# Patient Record
Sex: FEMALE | Race: WHITE
[De-identification: names, ages, dates, MRNs, and addresses within clinical notes are randomized per-mention and may not be internally consistent; named-entity substitution may affect disease eponyms.]

---

## 2017-08-17 ENCOUNTER — HOSPITAL ENCOUNTER (EMERGENCY)
Dept: HOSPITAL 56 - MW.ED | Age: 37
Discharge: HOME | End: 2017-08-17
Payer: COMMERCIAL

## 2017-08-17 VITALS — SYSTOLIC BLOOD PRESSURE: 117 MMHG | DIASTOLIC BLOOD PRESSURE: 64 MMHG

## 2017-08-17 DIAGNOSIS — L03.116: Primary | ICD-10-CM

## 2017-08-17 DIAGNOSIS — Z98.890: ICD-10-CM

## 2017-08-17 PROCEDURE — 85025 COMPLETE CBC W/AUTO DIFF WBC: CPT

## 2017-08-17 PROCEDURE — 36415 COLL VENOUS BLD VENIPUNCTURE: CPT

## 2017-08-17 PROCEDURE — 99283 EMERGENCY DEPT VISIT LOW MDM: CPT

## 2017-08-17 PROCEDURE — 73590 X-RAY EXAM OF LOWER LEG: CPT

## 2017-08-17 PROCEDURE — 96372 THER/PROPH/DIAG INJ SC/IM: CPT

## 2017-08-17 PROCEDURE — 73562 X-RAY EXAM OF KNEE 3: CPT

## 2017-08-17 PROCEDURE — 86140 C-REACTIVE PROTEIN: CPT

## 2017-08-17 PROCEDURE — 85652 RBC SED RATE AUTOMATED: CPT

## 2017-08-17 NOTE — EDM.PDOC
ED HPI GENERAL MEDICAL PROBLEM





- General


Chief Complaint: Lower Extremity Injury/Pain


Stated Complaint: LT LEG HURTS


Time Seen by Provider: 08/17/17 09:35





- History of Present Illness


INITIAL COMMENTS - FREE TEXT/NARRATIVE: 





HISTORY AND PHYSICAL:





History of present illness:


This is a 37-year-old female presenting to the emergency department complaining 

of left lower leg pain. Patient tells me she has a history of a fractured tibia 

requiring surgical intervention last year. She tells me that she began to 

experience pain in the exact spot of the break yesterday. She'll be she is 

having difficulty bending her knee. She is having difficulty bearing weight. 

She denies any trauma to the region. She denies any numbness or tingling. She 

denies any fevers chills nausea or vomiting. She has no other complaints. 





Review of systems: 


As per history of present illness and below otherwise all systems reviewed and 

negative.





Past medical history: 


As per history of present illness and as reviewed below otherwise 

noncontributory.





Surgical history: 


As per history of present illness and as reviewed below otherwise 

noncontributory.





Social history: 


No reported history of drug or alcohol abuse.





Family history: 


As per history of present illness and as reviewed below otherwise 

noncontributory.





Physical exam:


HEENT: Atraumatic, normocephalic, pupils reactive, negative for conjunctival 

pallor or scleral icterus, mucous membranes moist, throat clear, neck supple, 

nontender, trachea midline.


Lungs: Clear to auscultation, breath sounds equal bilaterally, chest nontender.


Heart: S1S2, regular, negative for clicks, rubs, or JVD.


Abdomen: Soft, nondistended, nontender. Negative for masses or 

hepatosplenomegaly. Negative for costovertebral tenderness.


Pelvis: Stable nontender.


Genitourinary: Deferred.


Rectal: Deferred.


Extremities:Tenderness to palpation of the lateral aspect of the leg. No knee 

joint effusion. No tenderness to palpation. Patient was able to flex her knee 

with assistance. Ankle and hip exam grossly normal. 


Neuro: Awake, alert, oriented. Cranial nerves II through XII unremarkable. 

Cerebellum unremarkable. Motor and sensory unremarkable throughout. Exam 

nonfocal.





Diagnostics:


Knee x-ray 


Tibia fibula x-ray 


CBC 


CMP 


Therapeutics:


IM Toradol 30 mg.





Impression: 


Cellulitis 


Plan:


Discharged home. Follow-up with orthopedics


Keflex 500 mg by mouth 3 times a day 10 days #30 tabs 0 refills. Follow-up 

with her primary care provider.











  ** left leg


Pain Score (Numeric/FACES): 7





- Related Data


 Allergies











Allergy/AdvReac Type Severity Reaction Status Date / Time


 


No Known Allergies Allergy   Verified 08/17/17 09:45











Home Meds: 


 Home Meds





Cephalexin [Keflex] 500 mg PO TID #30 cap 08/17/17 [Rx]


Naproxen 500 mg PO BID #20 tablet 08/17/17 [Rx]











Past Medical History





- Past Health History


Medical/Surgical History: Denies Medical/Surgical History





- Past Surgical History


Musculoskeletal Surgical History: Reports: Other (See Below)


Other Musculoskeletal Surgeries/Procedures:: left leg sx





Social & Family History





- Family History


Family Medical History: Noncontributory





- Tobacco Use


Smoking Status *Q: Never Smoker


Years of Tobacco use: 14


Packs/Tins Daily: 0.5





- Recreational Drug Use


Recreational Drug Use: No





Review of Systems





- Review of Systems


Review Of Systems: See Below





ED EXAM, GENERAL





- Physical Exam


Exam: See Below





Course





- Vital Signs


Last Recorded V/S: 


 Last Vital Signs











Temp  36.2 C   08/17/17 09:46


 


Pulse  97   08/17/17 09:46


 


Resp  20   08/17/17 09:46


 


BP  112/75   08/17/17 09:46


 


Pulse Ox  98   08/17/17 09:46














- Orders/Labs/Meds


Orders: 


 Active Orders 24 hr











 Category Date Time Status


 


 CRP [C-REACTIVE PROTEIN] [CHEM] Stat Lab  08/17/17 10:50 Received


 


 SEDIMENTATION RATE AUTO [HEME] Stat Lab  08/17/17 10:50 Received











Labs: 


 Laboratory Tests











  08/17/17 Range/Units





  10:50 


 


WBC  14.21 H  (4.0-11.0)  K/uL


 


RBC  4.76  (4.30-5.90)  M/uL


 


Hgb  14.4  (12.0-16.0)  g/dL


 


Hct  42.7  (36.0-46.0)  %


 


MCV  89.7  (80.0-98.0)  fL


 


MCH  30.3  (27.0-32.0)  pg


 


MCHC  33.7  (31.0-37.0)  g/dL


 


RDW Std Deviation  43.3  (28.0-62.0)  fl


 


RDW Coeff of Linda  13  (11.0-15.0)  %


 


Plt Count  294  (150-400)  K/uL


 


MPV  11.40  (7.40-12.00)  fL


 


Neut % (Auto)  59.8  (48.0-80.0)  %


 


Lymph % (Auto)  29.3  (16.0-40.0)  %


 


Mono % (Auto)  7.5  (0.0-15.0)  %


 


Eos % (Auto)  3.0  (0.0-7.0)  %


 


Baso % (Auto)  0.4  (0.0-1.5)  %


 


Neut # (Auto)  8.5 H  (1.4-5.7)  K/uL


 


Lymph # (Auto)  4.2 H  (0.6-2.4)  K/uL


 


Mono # (Auto)  1.1 H  (0.0-0.8)  K/uL


 


Eos # (Auto)  0.4  (0.0-0.7)  K/uL


 


Baso # (Auto)  0.1  (0.0-0.1)  K/uL











Meds: 


Medications














Discontinued Medications














Generic Name Dose Route Start Last Admin





  Trade Name Freq  PRN Reason Stop Dose Admin


 


Ketorolac Tromethamine  30 mg  08/17/17 09:58  08/17/17 10:23





  Toradol  IVPUSH  08/17/17 09:59  Not Given





  ONETIME ONE   


 


Ketorolac Tromethamine  30 mg  08/17/17 10:18  08/17/17 10:24





  Toradol  IM  08/17/17 10:19  30 mg





  ONETIME ONE   Administration














Departure





- Departure


Time of Disposition: 11:36


Disposition: Home, Self-Care 01


Condition: Good


Clinical Impression: 


 Cellulitis of knee, left








- Discharge Information


Prescriptions: 


Cephalexin [Keflex] 500 mg PO TID #30 cap


Naproxen 500 mg PO BID #20 tablet


Referrals: 


PCP,None [Primary Care Provider] - 


Becky Brizuela MD [Physician] - 


Ran Howard MD [Physician] - 


Forms:  ED Department Discharge


Additional Instructions: 





The following information is given to patients seen in the emergency department 

who are being discharged to home. This information is to outline your options 

for follow-up care. We provide all patients seen in our emergency department 

with a follow-up referral.





The need for follow-up, as well as the timing and circumstances, are variable 

depending upon the specifics of your emergency department visit.





If you don't have a primary care physician on staff, we will provide you with a 

referral. We always advise you to contact your personal physician following an 

emergency department visit to inform them of the circumstance of the visit and 

for follow-up with them and/or the need for any referrals to a consulting 

specialist.





The emergency department will also refer you to a specialist when appropriate. 

This referral assures that you have the opportunity for follow-up care with a 

specialist. All of these measure are taken in an effort to provide you with 

optimal care, which includes your follow-up.





Under all circumstances we always encourage you to contact your private 

physician who remains a resource for coordinating your care. When calling for 

follow-up care, please make the office aware that this follow-up is from your 

recent emergency room visit. If for any reason you are refused follow-up, 

please contact the [facility] Emergency 

Department at [phone] and asked to speak to the emergency department 

charge nurse.





It was found at today's visit that likely you have the soft tissue infection 

going on requiring antibiotics. Also recommend anti-inflammatories for this. 

Reportedly follow-up with orthopedic along with your primary care provider..














- My Orders


Last 24 Hours: 


My Active Orders





08/17/17 10:50


CRP [C-REACTIVE PROTEIN] [CHEM] Stat 


SEDIMENTATION RATE AUTO [HEME] Stat 














- Assessment/Plan


Last 24 Hours: 


My Active Orders





08/17/17 10:50


CRP [C-REACTIVE PROTEIN] [CHEM] Stat 


SEDIMENTATION RATE AUTO [HEME] Stat

## 2017-08-17 NOTE — CR
EXAMINATION: Left tibia and fibula and left knee

 

HISTORY: Pain

 

COMPARISON: CT dated 6/14/2016

 

TECHNIQUE: 2 views of the left tibia and fibula and 3 views of the left knee

 

FINDINGS: There is no acute osseous abnormality, dislocation, or fracture.  Again noted is screw and
 plate hardware fixating the tibial plateau. Overall position and alignment appear unchanged. No rob
nt effusion or significant soft tissue swelling. Bone mineralization and joint spaces are grossly pr
eserved.

 

IMPRESSION: Stable screw and plate hardware fixation of the tibial plateau without an acute underlyi
ng osseous abnormality.

## 2017-08-17 NOTE — CR
EXAMINATION: Left tibia and fibula and left knee

 

HISTORY: Pain

 

COMPARISON: CT dated 6/14/2016

 

TECHNIQUE: 2 views of the left tibia and fibula and 3 views of the left knee

 

FINDINGS: There is no acute osseous abnormality, dislocation, or fracture.  Again noted is screw and
 plate hardware fixating the tibial plateau. Overall position and alignment appear unchanged. No orb
nt effusion or significant soft tissue swelling. Bone mineralization and joint spaces are grossly pr
eserved.

 

IMPRESSION: Stable screw and plate hardware fixation of the tibial plateau without an acute underlyi
ng osseous abnormality.

## 2018-04-07 ENCOUNTER — HOSPITAL ENCOUNTER (EMERGENCY)
Dept: HOSPITAL 56 - MW.ED | Age: 38
Discharge: HOME | End: 2018-04-07
Payer: MEDICAID

## 2018-04-07 VITALS — DIASTOLIC BLOOD PRESSURE: 75 MMHG | SYSTOLIC BLOOD PRESSURE: 106 MMHG

## 2018-04-07 DIAGNOSIS — F17.210: ICD-10-CM

## 2018-04-07 DIAGNOSIS — F19.10: ICD-10-CM

## 2018-04-07 DIAGNOSIS — R41.82: Primary | ICD-10-CM

## 2018-04-07 LAB
APAP SERPL-MCNC: 0 UG/ML
CHLORIDE SERPL-SCNC: 105 MMOL/L (ref 98–107)
SODIUM SERPL-SCNC: 140 MMOL/L (ref 136–145)

## 2018-04-07 PROCEDURE — 82550 ASSAY OF CK (CPK): CPT

## 2018-04-07 PROCEDURE — 84484 ASSAY OF TROPONIN QUANT: CPT

## 2018-04-07 PROCEDURE — 93005 ELECTROCARDIOGRAM TRACING: CPT

## 2018-04-07 PROCEDURE — 96360 HYDRATION IV INFUSION INIT: CPT

## 2018-04-07 PROCEDURE — 80053 COMPREHEN METABOLIC PANEL: CPT

## 2018-04-07 PROCEDURE — 71045 X-RAY EXAM CHEST 1 VIEW: CPT

## 2018-04-07 PROCEDURE — 85610 PROTHROMBIN TIME: CPT

## 2018-04-07 PROCEDURE — 36415 COLL VENOUS BLD VENIPUNCTURE: CPT

## 2018-04-07 PROCEDURE — 85025 COMPLETE CBC W/AUTO DIFF WBC: CPT

## 2018-04-07 PROCEDURE — 85730 THROMBOPLASTIN TIME PARTIAL: CPT

## 2018-04-07 PROCEDURE — 99285 EMERGENCY DEPT VISIT HI MDM: CPT

## 2018-04-07 PROCEDURE — G0480 DRUG TEST DEF 1-7 CLASSES: HCPCS

## 2018-04-07 PROCEDURE — 87040 BLOOD CULTURE FOR BACTERIA: CPT

## 2018-04-07 PROCEDURE — 70450 CT HEAD/BRAIN W/O DYE: CPT

## 2018-04-07 PROCEDURE — 80305 DRUG TEST PRSMV DIR OPT OBS: CPT

## 2018-04-07 PROCEDURE — 84443 ASSAY THYROID STIM HORMONE: CPT

## 2018-04-07 NOTE — EDM.PDOC
ED HPI GENERAL MEDICAL PROBLEM





- General


Chief Complaint: Neuro Symptoms/Deficits


Stated Complaint: STROKE


Time Seen by Provider: 04/07/18 20:35


Source of Information: Reports: Patient





- History of Present Illness


INITIAL COMMENTS - FREE TEXT/NARRATIVE: 





HISTORY AND PHYSICAL:


History of present illness:


[37-year-old female presents via EMS


The patient lives with her brother who called EMS tonight as on his arrival 

home from work she was found in the bathroom somewhat incoherent and appeared 

she had fallen off the toilet stool, she arrived slightly before my shift 

started and Dr. Silverio initiated a stroke code at that time.





 stroke score is 0, mental status is somewhat altered on arrival she appears 

intoxicated however no strength/motor deficits she is able to respond to 

questions denies hallucinations visual or audio





No fever nausea vomiting chills sweats no chest pain shortness breath headache 

dizziness or palpitation no bowel or urine symptoms








Last known well time 7:30 AM, niH stroke score 0, improving symptoms with 

hydration, clinically correlates with drug abuse use and dependence





patient is known to abuse methamphetamine in the past but is believed to have 

been sober over the last 2 years 





With prolonged stay here in the ER patient is now alert interactive up and 

around the exam room she has been eating taco [place] was delivered by her 

boyfriend and is in no distress she does not desire to stay she is in some 

denial and adamantly denial of any drug use whatsoever is offered observation 

and declines.


]


Review of systems: 


As per history of present illness and below otherwise all systems reviewed and 

negative.


Past medical history: 


As per history of present illness and as reviewed below otherwise 

noncontributory.


Surgical history: 


As per history of present illness and as reviewed below otherwise 

noncontributory.


Social history: 


No reported history of drug or alcohol abuse.


Family history: 


As per history of present illness and as reviewed below otherwise 

noncontributory.








Physical exam:


HEENT: Atraumatic, normocephalic, pupils reactive, negative for conjunctival 

pallor or scleral icterus, mucous membranes moist, throat clear, neck supple, 

nontender, trachea midline.


Lungs: Clear to auscultation, breath sounds equal bilaterally, chest nontender.


Heart: S1S2, regular, negative for clicks, rubs, or JVD.


Abdomen: Soft, nondistended, nontender. Negative for masses or 

hepatosplenomegaly. Negative for costovertebral tenderness.


Pelvis: Stable nontender.


Genitourinary: Deferred.


Rectal: Deferred.


Extremities: Atraumatic, negative for cords or calf pain. Neurovascular 

unremarkable.


Neuro: Awake, alert, oriented. Cranial nerves II through XII unremarkable. 

Cerebellum unremarkable. Motor and sensory unremarkable throughout. Exam 

nonfocal.








Diagnostics:


[CBC CMP UA drug screen INR


EKG


Chest 1 view


Head CT no contrast





]


Therapeutics:


[ liter normal saline bolus


Normal saline 1 25 mL per hour


]


Impression: 


[ altered mental status-resolved


Polysubstance abuse





]


Definitive disposition and diagnosis as appropriate pending reevaluation and 

review of above.





- Related Data


 Allergies











Allergy/AdvReac Type Severity Reaction Status Date / Time


 


No Known Allergies Allergy   Verified 04/07/18 19:17











Home Meds: 


 Home Meds





. [No Known Home Meds]  04/07/18 [History]











Past Medical History





- Past Health History


Medical/Surgical History: Denies Medical/Surgical History


OB/GYN History: Reports: Pregnancy





- Past Surgical History


Musculoskeletal Surgical History: Reports: Other (See Below)


Other Musculoskeletal Surgeries/Procedures:: left leg sx





Social & Family History





- Family History


Family Medical History: Noncontributory





- Tobacco Use


Smoking Status *Q: Current Every Day Smoker


Years of Tobacco use: 18


Packs/Tins Daily: 1





- Caffeine Use


Caffeine Use: Reports: Coffee, Soda





- Recreational Drug Use


Recreational Drug Use: Yes


Drug Use in Last 12 Months: No


Recreational Drug Type: Reports: Methamphetamine


Recreational Drug Use Frequency: Not Used In Over 6 Months





ED ROS GENERAL





- Review of Systems


Review Of Systems: ROS reveals no pertinent complaints other than HPI.





ED EXAM, GENERAL





- Physical Exam


Exam: See Below





Course





- Vital Signs


Last Recorded V/S: 


 Last Vital Signs











Temp  96.0 F   04/07/18 19:10


 


Pulse  74   04/07/18 19:10


 


Resp  16   04/07/18 19:10


 


BP  150/98 H  04/07/18 19:10


 


Pulse Ox  99   04/07/18 19:10














- Orders/Labs/Meds


Orders: 


 Active Orders 24 hr











 Category Date Time Status


 


 Assess Neurological Status [RC] ASDIRECTED Care  04/07/18 18:55 Active


 


 Bedrest [RC] ASDIRECTED Care  04/07/18 18:55 Active


 


 Blood Glucose Check, Bedside [RC] STAT Care  04/07/18 18:55 Active


 


 Cardiac Monitoring [RC] .AS DIRECTED Care  04/07/18 18:55 Active


 


 EKG Documentation Completion [RC] STAT Care  04/07/18 18:55 Active


 


 Height and Weight [RC] UPON Care  04/07/18 18:55 Active


 


 Initiate Acute Stroke Protocol [RC] STAT Care  04/07/18 18:55 Active


 


 NIH Stroke Scale [RC] ASDIRECTED Care  04/07/18 18:55 Active


 


 Nursing Bedside Swallow Screen [RC] ASDIRECTED Care  04/07/18 18:55 Active


 


 Oxygen Therapy [RC] ASDIRECTED Care  04/07/18 18:55 Active


 


 Stroke Education, General [RC] Click to Edit Care  04/07/18 18:55 Active


 


 Vital Signs [RC] Q15M Care  04/07/18 18:55 Active


 


 Chest 1V Frontal [CR] Stat Exams  04/07/18 19:38 Taken


 


 Head wo Cont [CT] Stat Exams  04/07/18 18:55 Taken


 


 CULTURE BLOOD [BC] Stat Lab  04/07/18 20:36 Received


 


 CULTURE BLOOD [BC] Stat Lab  04/07/18 20:47 Results


 


 DRUG SCREEN, URINE [URCHEM] Stat Lab  04/07/18 20:10 Ordered


 


 Sodium Chloride 0.9% [Saline Flush] Med  04/07/18 18:55 Active





 10 ml FLUSH ASDIRECTED PRN   


 


 Sodium Chloride 0.9% [Saline Flush] Med  04/07/18 18:55 Active





 2.5 ml FLUSH ASDIRECTED PRN   


 


 Blood Culture x2 Reflex Set [OM.PC] Stat Oth  04/07/18 19:28 Ordered


 


 Peripheral IV Insertion Adult [OM.PC] Stat Oth  04/07/18 18:55 Ordered


 


 Peripheral IV Insertion Adult [OM.PC] Stat Oth  04/07/18 18:55 Ordered








 Medication Orders





Sodium Chloride (Saline Flush)  10 ml FLUSH ASDIRECTED PRN


   PRN Reason: Keep Vein Open


Sodium Chloride (Saline Flush)  2.5 ml FLUSH ASDIRECTED PRN


   PRN Reason: Keep Vein Open








Labs: 


 Laboratory Tests











  04/07/18 04/07/18 04/07/18 Range/Units





  19:07 19:07 19:07 


 


WBC  13.77 H    (4.0-11.0)  K/uL


 


RBC  5.06    (4.30-5.90)  M/uL


 


Hgb  15.4    (12.0-16.0)  g/dL


 


Hct  45.4    (36.0-46.0)  %


 


MCV  89.7    (80.0-98.0)  fL


 


MCH  30.4    (27.0-32.0)  pg


 


MCHC  33.9    (31.0-37.0)  g/dL


 


RDW Std Deviation  45.3    (28.0-62.0)  fl


 


RDW Coeff of Linda  14    (11.0-15.0)  %


 


Plt Count  317    (150-400)  K/uL


 


MPV  11.40    (7.40-12.00)  fL


 


Neut % (Auto)  68.1    (48.0-80.0)  %


 


Lymph % (Auto)  22.7    (16.0-40.0)  %


 


Mono % (Auto)  7.1    (0.0-15.0)  %


 


Eos % (Auto)  1.8    (0.0-7.0)  %


 


Baso % (Auto)  0.3    (0.0-1.5)  %


 


Neut # (Auto)  9.4 H    (1.4-5.7)  K/uL


 


Lymph # (Auto)  3.1 H    (0.6-2.4)  K/uL


 


Mono # (Auto)  1.0 H    (0.0-0.8)  K/uL


 


Eos # (Auto)  0.3    (0.0-0.7)  K/uL


 


Baso # (Auto)  0.0    (0.0-0.1)  K/uL


 


Nucleated RBC %  0.0    /100WBC


 


Nucleated RBCs #  0    K/uL


 


INR   1.01   


 


APTT   29.0   (18.6-31.3)  SEC


 


Sodium    140  (136-145)  mmol/L


 


Potassium    3.8  (3.5-5.1)  mmol/L


 


Chloride    105  ()  mmol/L


 


Carbon Dioxide    28.1  (21.0-32.0)  mmol/L


 


BUN    11  (7.0-18.0)  mg/dL


 


Creatinine    0.8  (0.6-1.0)  mg/dL


 


Est Cr Clr Drug Dosing    TNP  


 


Estimated GFR (MDRD)    > 60.0  ml/min


 


Glucose    126 H  ()  mg/dL


 


Calcium    9.1  (8.5-10.1)  mg/dL


 


Total Bilirubin    0.7  (0.2-1.0)  mg/dL


 


AST    21  (15-37)  IU/L


 


ALT    23  (14-63)  IU/L


 


Alkaline Phosphatase    72  ()  U/L


 


Creatine Kinase     ()  U/L


 


Troponin I    < 0.050  (0.000-0.056)  ng/mL


 


Total Protein    7.7  (6.4-8.2)  g/dL


 


Albumin    3.9  (3.4-5.0)  g/dL


 


Globulin    3.8 H  (2.0-3.5)  g/dL


 


Albumin/Globulin Ratio    1.0 L  (1.3-2.8)  


 


TSH 3rd Generation    2.17  (0.36-3.74)  uIU/mL


 


Salicylates    2.3  (0-20)  mg/dL


 


Urine Opiates Screen     (NEGATIVE)  


 


Ur Oxycodone Screen     (NEGATIVE)  


 


Urine Methadone Screen     (NEGATIVE)  


 


Acetaminophen    0.0  ug/mL


 


Ur Barbiturates Screen     (NEGATIVE)  


 


Ur Phencyclidine Scrn     (NEGATIVE)  


 


Ur Amphetamine Screen     (NEGATIVE)  


 


U Methamphetamines Scrn     (NEGATIVE)  


 


U Benzodiazepines Scrn     (NEGATIVE)  


 


U Cocaine Metab Screen     (NEGATIVE)  


 


U Marijuana (THC) Screen     (NEGATIVE)  


 


Ethyl Alcohol    < 3.0  mg/dL














  04/07/18 04/07/18 Range/Units





  19:07 20:10 


 


WBC    (4.0-11.0)  K/uL


 


RBC    (4.30-5.90)  M/uL


 


Hgb    (12.0-16.0)  g/dL


 


Hct    (36.0-46.0)  %


 


MCV    (80.0-98.0)  fL


 


MCH    (27.0-32.0)  pg


 


MCHC    (31.0-37.0)  g/dL


 


RDW Std Deviation    (28.0-62.0)  fl


 


RDW Coeff of Lnida    (11.0-15.0)  %


 


Plt Count    (150-400)  K/uL


 


MPV    (7.40-12.00)  fL


 


Neut % (Auto)    (48.0-80.0)  %


 


Lymph % (Auto)    (16.0-40.0)  %


 


Mono % (Auto)    (0.0-15.0)  %


 


Eos % (Auto)    (0.0-7.0)  %


 


Baso % (Auto)    (0.0-1.5)  %


 


Neut # (Auto)    (1.4-5.7)  K/uL


 


Lymph # (Auto)    (0.6-2.4)  K/uL


 


Mono # (Auto)    (0.0-0.8)  K/uL


 


Eos # (Auto)    (0.0-0.7)  K/uL


 


Baso # (Auto)    (0.0-0.1)  K/uL


 


Nucleated RBC %    /100WBC


 


Nucleated RBCs #    K/uL


 


INR    


 


APTT    (18.6-31.3)  SEC


 


Sodium    (136-145)  mmol/L


 


Potassium    (3.5-5.1)  mmol/L


 


Chloride    ()  mmol/L


 


Carbon Dioxide    (21.0-32.0)  mmol/L


 


BUN    (7.0-18.0)  mg/dL


 


Creatinine    (0.6-1.0)  mg/dL


 


Est Cr Clr Drug Dosing    


 


Estimated GFR (MDRD)    ml/min


 


Glucose    ()  mg/dL


 


Calcium    (8.5-10.1)  mg/dL


 


Total Bilirubin    (0.2-1.0)  mg/dL


 


AST    (15-37)  IU/L


 


ALT    (14-63)  IU/L


 


Alkaline Phosphatase    ()  U/L


 


Creatine Kinase  130   ()  U/L


 


Troponin I    (0.000-0.056)  ng/mL


 


Total Protein    (6.4-8.2)  g/dL


 


Albumin    (3.4-5.0)  g/dL


 


Globulin    (2.0-3.5)  g/dL


 


Albumin/Globulin Ratio    (1.3-2.8)  


 


TSH 3rd Generation    (0.36-3.74)  uIU/mL


 


Salicylates    (0-20)  mg/dL


 


Urine Opiates Screen   NEGATIVE  (NEGATIVE)  


 


Ur Oxycodone Screen   NEGATIVE  (NEGATIVE)  


 


Urine Methadone Screen   NEGATIVE  (NEGATIVE)  


 


Acetaminophen    ug/mL


 


Ur Barbiturates Screen   NEGATIVE  (NEGATIVE)  


 


Ur Phencyclidine Scrn   NEGATIVE  (NEGATIVE)  


 


Ur Amphetamine Screen   POSITIVE  (NEGATIVE)  


 


U Methamphetamines Scrn   POSITIVE  (NEGATIVE)  


 


U Benzodiazepines Scrn   NEGATIVE  (NEGATIVE)  


 


U Cocaine Metab Screen   NEGATIVE  (NEGATIVE)  


 


U Marijuana (THC) Screen   POSITIVE  (NEGATIVE)  


 


Ethyl Alcohol    mg/dL











Meds: 


Medications











Generic Name Dose Route Start Last Admin





  Trade Name Freq  PRN Reason Stop Dose Admin


 


Sodium Chloride  10 ml  04/07/18 18:55  





  Saline Flush  FLUSH   





  ASDIRECTED PRN   





  Keep Vein Open   





     





     





     


 


Sodium Chloride  2.5 ml  04/07/18 18:55  





  Saline Flush  FLUSH   





  ASDIRECTED PRN   





  Keep Vein Open   





     





     





     














Discontinued Medications














Generic Name Dose Route Start Last Admin





  Trade Name Yu  PRN Reason Stop Dose Admin


 


Sodium Chloride  1,000 mls @ 999 mls/hr  04/07/18 19:37  04/07/18 19:52





  Normal Saline  IV  04/07/18 20:37  999 mls/hr





  STAT ONE   Administration





     





     





     





     














Departure





- Departure


Time of Disposition: 21:45


Disposition: Home, Self-Care 01


Condition: Fair


Clinical Impression: 


 Polysubstance abuse








- Discharge Information


Referrals: 


PCP,None [Primary Care Provider] - 


Forms:  ED Department Discharge


Additional Instructions: 


The following information is given to patients seen in the emergency department 

who are being discharged to home. This information is to outline your options 

for follow-up care. We provide all patients seen in our emergency department 

with a follow-up referral.





The need for follow-up, as well as the timing and circumstances, are variable 

depending upon the specifics of your emergency department visit.





If you don't have a primary care physician on staff, we will provide you with a 

referral. We always advise you to contact your personal physician following an 

emergency department visit to inform them of the circumstance of the visit and 

for follow-up with them and/or the need for any referrals to a consulting 

specialist.





The emergency department will also refer you to a specialist when appropriate. 

This referral assures that you have the opportunity for follow-up care with a 

specialist. All of these measure are taken in an effort to provide you with 

optimal care, which includes your follow-up.





Under all circumstances we always encourage you to contact your private 

physician who remains a resource for coordinating your care. When calling for 

follow-up care, please make the office aware that this follow-up is from your 

recent emergency room visit. If for any reason you are refused follow-up, 

please contact the [facility] emergency department at [phone] 

and asked to speak to the emergency department charge nurse.








- My Orders


Last 24 Hours: 


My Active Orders





04/07/18 19:28


Blood Culture x2 Reflex Set [OM.PC] Stat 





04/07/18 19:38


Chest 1V Frontal [CR] Stat 





04/07/18 20:36


CULTURE BLOOD [BC] Stat 





04/07/18 20:47


CULTURE BLOOD [BC] Stat 














- Assessment/Plan


Last 24 Hours: 


My Active Orders





04/07/18 19:28


Blood Culture x2 Reflex Set [OM.PC] Stat 





04/07/18 19:38


Chest 1V Frontal [CR] Stat 





04/07/18 20:36


CULTURE BLOOD [BC] Stat 





04/07/18 20:47


CULTURE BLOOD [BC] Stat

## 2018-04-09 NOTE — CR
EXAM DATE: 18



PATIENT'S AGE: 37





Patient: MARANDA MCLAUGHLIN



Facility: [place], [place]

Patient ID: [id number]

Site Patient ID: [id number].

Site Accession #: [id number].

: 1980

Study: XRay Chest [id number]/7/2018 8:07:38 PM

Ordering Physician: Vashti Corona



Final Report: 

INDICATION:

Pain. Stroke. Code.



TECHNIQUE:

AP image of the chest.



COMPARISON:

None.



FINDINGS:

Lungs and pleural spaces clear. Heart, mediastinum and pulmonary vessels 
normal. No significant osseous abnormality.



IMPRESSION:

Negative chest.





Dictated by Constantine Payton MD @ 2018 8:18PM

(Electronic Signature)



Report Signed by Proxy.
FISH

## 2018-04-09 NOTE — CT
EXAM DATE: 18



PATIENT'S AGE: 37





Patient: MARANDA MCLAUGHLIN



Facility: [facility], [place], [place]

Patient ID: [id number]

: 2015

Study: CT Head STROKE PROTOCOL -2018 6:58:39 PM

Ordering Physician: PORTILLO



Final Report: 

INDICATION:

Altered mental status.



TECHNIQUE:

Noncontrast CT of the brain was performed with images acquired from skull base 
to vertex.



COMPARISON:

None available.



FINDINGS:

There is no acute intracranial hemorrhage. Ventricles are of normal size and 
morphology. No mass effect or midline shift is present. The gray-white matter 
differentiation is normal. The visualized portions of the orbits are normal. 
The visualized portions of the mastoids are normal. The visualized portions of 
the paranasal sinuses are normal. No fractures are identified. 



IMPRESSION:

No acute intracranial abnormality.



Please note that all CT scans at this facility use dose modulation, iterative 
reconstruction, and/or weight-based dosing when appropriate to reduce radiation 
dose to as low as reasonably achievable.



Dictated by Naseem Borjas MD @ 2018 6:58PM

(Electronic Signature)



Report Signed by Proxy.
MTDD

## 2020-01-13 ENCOUNTER — HOSPITAL ENCOUNTER (EMERGENCY)
Dept: HOSPITAL 56 - MW.ED | Age: 40
Discharge: HOME | End: 2020-01-13
Payer: COMMERCIAL

## 2020-01-13 VITALS — SYSTOLIC BLOOD PRESSURE: 135 MMHG | HEART RATE: 77 BPM | DIASTOLIC BLOOD PRESSURE: 92 MMHG

## 2020-01-13 DIAGNOSIS — M94.0: Primary | ICD-10-CM

## 2020-01-13 PROCEDURE — 93005 ELECTROCARDIOGRAM TRACING: CPT

## 2020-01-13 PROCEDURE — 36415 COLL VENOUS BLD VENIPUNCTURE: CPT

## 2020-01-13 PROCEDURE — 85025 COMPLETE CBC W/AUTO DIFF WBC: CPT

## 2020-01-13 PROCEDURE — 84484 ASSAY OF TROPONIN QUANT: CPT

## 2020-01-13 PROCEDURE — 71045 X-RAY EXAM CHEST 1 VIEW: CPT

## 2020-01-13 PROCEDURE — 99285 EMERGENCY DEPT VISIT HI MDM: CPT

## 2020-01-13 NOTE — CR
Chest: Portable view of the chest was obtained.

 

Comparison: No previous chest x-ray.

 

Heart size and mediastinum are within normal limits for portable 

technique.  Lungs are clear with no acute parenchymal change.  Minimal

 scoliosis is noted within the spine.  Bony structures are otherwise 

grossly intact.

 

Impression:

1.  Nothing acute is appreciated on portable chest x-ray.

 

Diagnostic code #2

 

This report was dictated in [place] Standard Time

## 2020-01-13 NOTE — EDM.PDOC
ED HPI GENERAL MEDICAL PROBLEM





- General


Chief Complaint: Chest Pain


Stated Complaint: SPOKE TO NURSE


Time Seen by Provider: 01/13/20 19:36


Source of Information: Reports: Patient


History Limitations: Reports: No Limitations





- History of Present Illness


INITIAL COMMENTS - FREE TEXT/NARRATIVE: 





This 39-year-old female presents to the emergency room with a history of chest 

pain.  Patient states she has pain in the middle of her chest since 7 AM this 

morning.  Denies any previous history of cardiac disease.  Patient denies high 

blood pressure, denies diabetes, denies family history of heart disease.


Onset: Today


Duration: Hour(s):


Location: Reports: Chest


Severity: Mild


Improves with: Reports: None


Worsens with: Reports: None


Context: Reports: Activity


Associated Symptoms: Reports: No Other Symptoms, Nausea/Vomiting


  ** chest


Pain Score (Numeric/FACES): 7





- Related Data


 Allergies











Allergy/AdvReac Type Severity Reaction Status Date / Time


 


No Known Allergies Allergy   Verified 01/13/20 19:36











Home Meds: 


 Home Meds





. [No Known Home Meds]  04/07/18 [History]











Past Medical History





- Past Health History


Medical/Surgical History: Denies Medical/Surgical History


OB/GYN History: Reports: Pregnancy





- Past Surgical History


Musculoskeletal Surgical History: Reports: Other (See Below)


Other Musculoskeletal Surgeries/Procedures:: left leg sx





Social & Family History





- Family History


Family Medical History: Noncontributory





- Caffeine Use


Caffeine Use: Reports: Coffee, Soda





ED ROS GENERAL





- Review of Systems


Review Of Systems: Comprehensive ROS is negative, except as noted in HPI.


Constitutional: Reports: No Symptoms


HEENT: Reports: No Symptoms


Respiratory: Reports: No Symptoms


Cardiovascular: Reports: Chest Pain


Endocrine: Reports: No Symptoms


GI/Abdominal: Reports: No Symptoms


: Reports: No Symptoms


Musculoskeletal: Reports: No Symptoms


Skin: Reports: No Symptoms


Neurological: Reports: No Symptoms


Psychiatric: Reports: No Symptoms


Hematologic/Lymphatic: Reports: No Symptoms


Immunologic: Reports: No Symptoms





ED EXAM, GENERAL





- Physical Exam


Exam: See Below


Exam Limited By: No Limitations


General Appearance: Alert, WD/WN, No Apparent Distress


Eye Exam: Bilateral Eye: Normal Fundi, Normal Inspection


Nose: Normal Inspection, Normal Mucosa, No Blood


Throat/Mouth: Normal Inspection, Normal Lips, Normal Teeth, Normal Oropharynx, 

Normal Voice, No Airway Compromise


Head: Atraumatic, Normocephalic


Neck: Normal Inspection


Respiratory/Chest: No Respiratory Distress, Lungs Clear, Normal Breath Sounds, 

No Accessory Muscle Use, Chest Non-Tender


Cardiovascular: Normal Peripheral Pulses, Regular Rate, Rhythm, No Gallop, No 

JVD, No Murmur


GI/Abdominal: Normal Bowel Sounds, Soft, Non-Tender, No Organomegaly, No 

Distention, No Abnormal Bruit, No Mass


 (Female) Exam: Deferred


Rectal (Female) Exam: Deferred


Back Exam: Normal Inspection, Full Range of Motion


Extremities: Normal Inspection, Normal Range of Motion, Non-Tender, No Pedal 

Edema, Normal Capillary Refill





EKG INTERPRETATION


EKG Interpretation Comments: 





Is not acute rate is normal patient having no signs of an infarct or ischemia





Course





- Vital Signs


Text/Narrative:: 





Is coming the emergency room with chest pain since 7:00 in the morning.  

Patient had cardiac enzymes performed which are normal.  Patient has a chest x-

ray which is normal.  Patient however does have a slight leukocytosis.  Will be 

discharged back to care home no further treatment needed


Last Recorded V/S: 


 Last Vital Signs











Temp  97.5 F   01/13/20 19:25


 


Pulse  75   01/13/20 19:59


 


Resp  18   01/13/20 19:25


 


BP  117/81   01/13/20 19:59


 


Pulse Ox  97   01/13/20 19:25














- Orders/Labs/Meds


Orders: 


 Active Orders 24 hr











 Category Date Time Status


 


 EKG Documentation Completion [RC] STAT Care  01/13/20 19:25 Active











Labs: 


 Laboratory Tests











  01/13/20 01/13/20 Range/Units





  19:47 19:47 


 


WBC  14.34 H   (4.0-11.0)  K/uL


 


RBC  4.98   (4.30-5.90)  M/uL


 


Hgb  14.6   (12.0-16.0)  g/dL


 


Hct  43.7   (36.0-46.0)  %


 


MCV  87.8   (80.0-98.0)  fL


 


MCH  29.3   (27.0-32.0)  pg


 


MCHC  33.4   (31.0-37.0)  g/dL


 


RDW Std Deviation  42.8   (28.0-62.0)  fl


 


RDW Coeff of Linda  13   (11.0-15.0)  %


 


Plt Count  326   (150-400)  K/uL


 


MPV  11.80   (7.40-12.00)  fL


 


Neut % (Auto)  57.1   (48.0-80.0)  %


 


Lymph % (Auto)  33.9   (16.0-40.0)  %


 


Mono % (Auto)  6.7   (0.0-15.0)  %


 


Eos % (Auto)  1.9   (0.0-7.0)  %


 


Baso % (Auto)  0.4   (0.0-1.5)  %


 


Neut # (Auto)  8.2 H   (1.4-5.7)  K/uL


 


Lymph # (Auto)  4.9 H   (0.6-2.4)  K/uL


 


Mono # (Auto)  1.0 H   (0.0-0.8)  K/uL


 


Eos # (Auto)  0.3   (0.0-0.7)  K/uL


 


Baso # (Auto)  0.1   (0.0-0.1)  K/uL


 


Nucleated RBC %  0.0   /100WBC


 


Nucleated RBCs #  0   K/uL


 


Troponin I   < 0.050  (0.000-0.056)  ng/mL











Meds: 


Medications














Discontinued Medications














Generic Name Dose Route Start Last Admin





  Trade Name Freq  PRN Reason Stop Dose Admin


 


Aspirin  324 mg  01/13/20 19:34  01/13/20 19:41





  Aspirin  PO  01/13/20 19:35  324 mg





  ONETIME ONE   Administration





     





     





     





     














Departure





- Departure


Time of Disposition: 20:50


Disposition: Home, Self-Care 01


Clinical Impression: 


 Costochondritis





Instructions:  Costochondritis, Easy-to-Read


Referrals: 


PCP,Unobtain [Primary Care Provider] - 


Forms:  ED Department Discharge





Sepsis Event Note





- Focused Exam


Vital Signs: 


 Vital Signs











  Temp Pulse Resp BP Pulse Ox


 


 01/13/20 19:59   75   117/81 


 


 01/13/20 19:25  97.5 F  75  18  143/111 H  97











Date Exam was Performed: 01/13/20


Time Exam was Performed: 20:49





- My Orders


Last 24 Hours: 


My Active Orders





01/13/20 19:25


EKG Documentation Completion [RC] STAT 














- Assessment/Plan


Last 24 Hours: 


My Active Orders





01/13/20 19:25


EKG Documentation Completion [RC] STAT

## 2022-11-22 ENCOUNTER — HOSPITAL ENCOUNTER (EMERGENCY)
Dept: HOSPITAL 56 - MW.ED | Age: 42
Discharge: HOME | End: 2022-11-22
Payer: MEDICAID

## 2022-11-22 VITALS — SYSTOLIC BLOOD PRESSURE: 110 MMHG | DIASTOLIC BLOOD PRESSURE: 72 MMHG | HEART RATE: 72 BPM

## 2022-11-22 DIAGNOSIS — Z79.899: ICD-10-CM

## 2022-11-22 DIAGNOSIS — K04.7: Primary | ICD-10-CM

## 2022-11-22 PROCEDURE — 99282 EMERGENCY DEPT VISIT SF MDM: CPT

## 2023-03-21 ENCOUNTER — HOSPITAL ENCOUNTER (EMERGENCY)
Dept: HOSPITAL 56 - MW.ED | Age: 43
Discharge: HOME | End: 2023-03-21
Payer: MEDICAID

## 2023-03-21 VITALS — DIASTOLIC BLOOD PRESSURE: 86 MMHG | SYSTOLIC BLOOD PRESSURE: 135 MMHG | HEART RATE: 89 BPM

## 2023-03-21 DIAGNOSIS — Z72.0: ICD-10-CM

## 2023-03-21 DIAGNOSIS — Z20.822: ICD-10-CM

## 2023-03-21 DIAGNOSIS — K52.9: Primary | ICD-10-CM

## 2023-03-21 LAB
BUN SERPL-MCNC: 14 MG/DL (ref 7–18)
CHLORIDE SERPL-SCNC: 103 MMOL/L (ref 98–107)
CO2 SERPL-SCNC: 20 MMOL/L (ref 21–32)
EGFRCR SERPLBLD CKD-EPI 2021: 73 ML/MIN (ref 60–?)
FLUAV RNA UPPER RESP QL NAA+PROBE: NEGATIVE
FLUBV RNA UPPER RESP QL NAA+PROBE: NEGATIVE
GLUCOSE SERPL-MCNC: 112 MG/DL (ref 74–106)
LIPASE SERPL-CCNC: 51 U/L (ref 73–393)
POTASSIUM SERPL-SCNC: 4.3 MMOL/L (ref 3.5–5.1)
SARS-COV-2 RNA RESP QL NAA+PROBE: NEGATIVE
SODIUM SERPL-SCNC: 135 MMOL/L (ref 136–145)

## 2023-03-21 PROCEDURE — 99284 EMERGENCY DEPT VISIT MOD MDM: CPT

## 2023-03-21 PROCEDURE — 96374 THER/PROPH/DIAG INJ IV PUSH: CPT

## 2023-03-21 PROCEDURE — 84484 ASSAY OF TROPONIN QUANT: CPT

## 2023-03-21 PROCEDURE — 93005 ELECTROCARDIOGRAM TRACING: CPT

## 2023-03-21 PROCEDURE — 83690 ASSAY OF LIPASE: CPT

## 2023-03-21 PROCEDURE — 96375 TX/PRO/DX INJ NEW DRUG ADDON: CPT

## 2023-03-21 PROCEDURE — 81001 URINALYSIS AUTO W/SCOPE: CPT

## 2023-03-21 PROCEDURE — 84703 CHORIONIC GONADOTROPIN ASSAY: CPT

## 2023-03-21 PROCEDURE — 36415 COLL VENOUS BLD VENIPUNCTURE: CPT

## 2023-03-21 PROCEDURE — 80053 COMPREHEN METABOLIC PANEL: CPT

## 2023-03-21 PROCEDURE — 0240U: CPT

## 2023-03-21 PROCEDURE — 74177 CT ABD & PELVIS W/CONTRAST: CPT

## 2023-03-21 PROCEDURE — 85025 COMPLETE CBC W/AUTO DIFF WBC: CPT

## 2023-03-21 PROCEDURE — 96361 HYDRATE IV INFUSION ADD-ON: CPT

## 2023-03-21 PROCEDURE — 71045 X-RAY EXAM CHEST 1 VIEW: CPT

## 2023-04-17 ENCOUNTER — HOSPITAL ENCOUNTER (EMERGENCY)
Dept: HOSPITAL 56 - MW.ED | Age: 43
Discharge: LEFT BEFORE DISCHARGE | End: 2023-04-17
Payer: MEDICAID

## 2023-04-17 DIAGNOSIS — Z53.21: Primary | ICD-10-CM

## 2023-07-10 ENCOUNTER — HOSPITAL ENCOUNTER (OUTPATIENT)
Dept: HOSPITAL 56 - MW.SDS | Age: 43
Discharge: HOME | End: 2023-07-10
Attending: SURGERY
Payer: MEDICAID

## 2023-07-10 VITALS — DIASTOLIC BLOOD PRESSURE: 88 MMHG | SYSTOLIC BLOOD PRESSURE: 133 MMHG | HEART RATE: 72 BPM

## 2023-07-10 DIAGNOSIS — M54.9: ICD-10-CM

## 2023-07-10 DIAGNOSIS — Z83.79: ICD-10-CM

## 2023-07-10 DIAGNOSIS — Z98.890: ICD-10-CM

## 2023-07-10 DIAGNOSIS — F17.210: ICD-10-CM

## 2023-07-10 DIAGNOSIS — G89.29: ICD-10-CM

## 2023-07-10 DIAGNOSIS — K82.8: ICD-10-CM

## 2023-07-10 DIAGNOSIS — K21.9: ICD-10-CM

## 2023-07-10 DIAGNOSIS — Z79.899: ICD-10-CM

## 2023-07-10 DIAGNOSIS — K80.10: Primary | ICD-10-CM

## 2023-07-10 DIAGNOSIS — M54.2: ICD-10-CM

## 2023-07-10 PROCEDURE — 81025 URINE PREGNANCY TEST: CPT

## 2023-07-10 PROCEDURE — 64488 TAP BLOCK BI INJECTION: CPT

## 2023-07-10 PROCEDURE — 47562 LAPAROSCOPIC CHOLECYSTECTOMY: CPT
